# Patient Record
Sex: MALE | Employment: FULL TIME | URBAN - METROPOLITAN AREA
[De-identification: names, ages, dates, MRNs, and addresses within clinical notes are randomized per-mention and may not be internally consistent; named-entity substitution may affect disease eponyms.]

---

## 2017-08-16 ENCOUNTER — TRANSFERRED RECORDS (OUTPATIENT)
Dept: HEALTH INFORMATION MANAGEMENT | Facility: CLINIC | Age: 37
End: 2017-08-16

## 2017-08-16 ENCOUNTER — MEDICAL CORRESPONDENCE (OUTPATIENT)
Dept: HEALTH INFORMATION MANAGEMENT | Facility: CLINIC | Age: 37
End: 2017-08-16

## 2017-08-21 ENCOUNTER — TRANSFERRED RECORDS (OUTPATIENT)
Dept: HEALTH INFORMATION MANAGEMENT | Facility: CLINIC | Age: 37
End: 2017-08-21

## 2017-09-29 ENCOUNTER — PRE VISIT (OUTPATIENT)
Dept: OTOLARYNGOLOGY | Facility: CLINIC | Age: 37
End: 2017-09-29

## 2017-09-29 NOTE — TELEPHONE ENCOUNTER
Records received from [facility].  Office notes:  DOS 8/16/17 with Dr. Sreedhar Guerra  Radiology reports:   DOS 8/21/17 CT Temporal Bones ([facility]) - img in Pacs

## 2017-09-29 NOTE — TELEPHONE ENCOUNTER
1.  Date/reason for appt: 10/10/17 at 10:30 AM - Bilat mixed hearing loss    2.  Referring provider: [facility] Guerra    3.  Call to patient (Yes / No - short description): No    4.  Previous care at:   [facility]   [facility] (CT scan)

## 2017-10-10 DIAGNOSIS — H90.6 MIXED HEARING LOSS, BILATERAL: Primary | ICD-10-CM

## 2017-11-10 DIAGNOSIS — H90.6 MIXED HEARING LOSS, BILATERAL: Primary | ICD-10-CM

## 2017-11-14 ENCOUNTER — OFFICE VISIT (OUTPATIENT)
Dept: OTOLARYNGOLOGY | Facility: CLINIC | Age: 37
End: 2017-11-14

## 2017-11-14 ENCOUNTER — OFFICE VISIT (OUTPATIENT)
Dept: AUDIOLOGY | Facility: CLINIC | Age: 37
End: 2017-11-14

## 2017-11-14 VITALS — HEIGHT: 68 IN | BODY MASS INDEX: 29.4 KG/M2 | WEIGHT: 194 LBS

## 2017-11-14 DIAGNOSIS — H71.92 CHOLESTEATOMA, LEFT: ICD-10-CM

## 2017-11-14 DIAGNOSIS — H90.0 CONDUCTIVE HEARING LOSS, BILATERAL: ICD-10-CM

## 2017-11-14 DIAGNOSIS — H90.A11 CONDUCTIVE HEARING LOSS OF RIGHT EAR WITH RESTRICTED HEARING OF LEFT EAR: ICD-10-CM

## 2017-11-14 DIAGNOSIS — H90.A32 MIXED CONDUCTIVE AND SENSORINEURAL HEARING LOSS OF LEFT EAR WITH RESTRICTED HEARING OF RIGHT EAR: ICD-10-CM

## 2017-11-14 DIAGNOSIS — H61.23 EXCESSIVE CERUMEN IN BOTH EAR CANALS: ICD-10-CM

## 2017-11-14 DIAGNOSIS — H72.92 PERFORATION OF TYMPANIC MEMBRANE, LEFT: Primary | ICD-10-CM

## 2017-11-14 RX ORDER — OFLOXACIN 3 MG/ML
SOLUTION AURICULAR (OTIC)
Qty: 10 ML | Refills: 0 | Status: SHIPPED | OUTPATIENT
Start: 2017-11-14

## 2017-11-14 ASSESSMENT — PAIN SCALES - GENERAL: PAINLEVEL: NO PAIN (0)

## 2017-11-14 NOTE — PROGRESS NOTES
AUDIOLOGY REPORT    SUMMARY: Audiology visit completed. See audiogram for results.      RECOMMENDATIONS: Follow-up with ENT.    Charly Campo.  Licensed Audiologist  [place] #[id number]

## 2017-11-14 NOTE — LETTER
11/14/2017       RE: Xander Peralta  [address]  [place] [address]     Dear Colleague,    Thank you for referring your patient, Xander Peralta, to the [facility] EAR NOSE AND THROAT at [facility]. Please see a copy of my visit note below.    Dear  No primary care provider on file.:    I had the pleasure of meeting Xander Peralta in consultation today at the [facility] at your request.    HISTORY OF PRESENT ILLNESS: Patient is a 37-year-old in today for assessment of both ears. This had a hearing loss worse on the left side for many years. He finally came in for assessment of the hearing and was found to have problems with both ears and potential cholesteatoma. He says he had a lot of drainage in the years past, never really got that treated and it would just come and go. He hasn't had any significant drainage now for several years. Again the main concern has been hearing loss especially on the left side for over 10 years. He has not had any dizziness. No active drainage at this time, there was concern for a right fungal infection when he was seen elsewhere. He has not been using the drops that he was given. He denies any dysphagia, hoarseness, facial paresthesias.    ALLERGIES:  No Known Allergies    HABITS:   Alcohol use Yes    History   Smoking Status     Current Every Day Smoker     Packs/day: 1.00     Years: 10.00     Types: Cigarettes     Start date: 8/7/1996   Smokeless Tobacco     Not on file         PAST MEDICAL HISTORY: Please see today's intake form (for the remainder of the PMH) which I reviewed and signed.  Past Medical History:   Diagnosis Date     Conductive hearing loss little kid     Sensorineural hearing loss little kid       FAMILY HISTORY/SOCIAL HISTORY: No family history on file. Social History     Social History     Marital status:      Spouse name: N/A     Number of children: N/A     Years of education: N/A      Occupational History     Not on file.     Social History Main Topics     Smoking status: Current Every Day Smoker     Packs/day: 1.00     Years: 10.00     Types: Cigarettes     Start date: 8/7/1996     Smokeless tobacco: Not on file     Alcohol use Yes     Drug use: No     Sexual activity: Yes     Partners: Female     Birth control/ protection: Condom     Other Topics Concern     Not on file     Social History Narrative       REVIEW OF SYSTEMS: Please see today's intake form (for the remainder of the ROS) which I have reviewed and signed.    PHYSICIAL EXAMINATION:  Constitutional: The patient was well-groomed and in no acute distress.   Skin: Warm and pink.  Psychiatric: The patient's affect was calm, cooperative, and appropriate.   Respiratory: Breathing comfortably without stridor or exertion of accessory muscles.  Eyes: Pupils were equal and reactive. Extraocular movement intact.   Head: Normocephalic and atraumatic. No lesions or scars.  Ears: Both ears examined with a microscope. Under high-power magnification, it does look like he has significant debris within the right ear canal with possible fungal debris. This was cleaned thoroughly with suction, following cleaning, he has a severe inferior atelectatic TM. The left side was cleaned of significant cerumen. After cleaning, it looks like he has a large posterior TM perforation with attic defect. I can see the head of the stapes, the incus looks eroded. Looks like there is squamous debris within the attic defect.  Nose: Sinuses were nontender. Anterior rhinoscopy revealed midline septum and absence of purulence or polyps.  Oral Cavity: Normal tongue, floor of moth, buccal mucosa, and palate. No lesions or masses on inspection or palpation. No abnormal lymph tissue in the oropharynx.   Neck: The parotid is soft without masses. Supple with normal laryngeal and tracheal landmarks.   Lymphatic: There is no palpable lymphadenopathy or other masses in the neck.    Neurologic: Alert and oriented x 3. Cranial nerves III-XI within normal limits. Voice quality normal.  Cerebellar Function Tests:  Grossly normal    Audiogram:  Audiogram performed shows a mild conductive loss in the right ear and a large maximal conductive loss on the left. Tuning forks confirmed this with negative responses on the left and positive on the right.    IMPRESSION AND PLAN: I discussed the ear findings with him in detail today, concern for possible cholesteatoma and ossicular disruption on the left side causing the significant conductive loss. The external otitis on the right as well and discussed antibiotic ear drops to be used 3 times a day for a week, twice a day for a week, and once a day for a week to the right ear. Discussed the probable need for surgical intervention in the left ear, certainly to help with the hearing, and also remove any cholesteatoma that is found. Risk of surgery discussed as well. I'm going to get him on the eardrops and we will get the CT scan done sent over for review. He is to keep the ear dry until follow-up in 3-4 weeks. Total exam time of over 45 minutes with over half time spent on discussion of findings, treatment options and questions.    Thank you very much for the opportunity to participate in the care of your patient.    Rick L Nissen MD

## 2017-11-14 NOTE — PROGRESS NOTES
Dear  No primary care provider on file.:    I had the pleasure of meeting Xander Peralta in consultation today at the [facility] at your request.    HISTORY OF PRESENT ILLNESS: Patient is a 37-year-old in today for assessment of both ears. This had a hearing loss worse on the left side for many years. He finally came in for assessment of the hearing and was found to have problems with both ears and potential cholesteatoma. He says he had a lot of drainage in the years past, never really got that treated and it would just come and go. He hasn't had any significant drainage now for several years. Again the main concern has been hearing loss especially on the left side for over 10 years. He has not had any dizziness. No active drainage at this time, there was concern for a right fungal infection when he was seen elsewhere. He has not been using the drops that he was given. He denies any dysphagia, hoarseness, facial paresthesias.    ALLERGIES:  No Known Allergies    HABITS:   Alcohol use Yes    History   Smoking Status     Current Every Day Smoker     Packs/day: 1.00     Years: 10.00     Types: Cigarettes     Start date: 8/7/1996   Smokeless Tobacco     Not on file         PAST MEDICAL HISTORY: Please see today's intake form (for the remainder of the PMH) which I reviewed and signed.  Past Medical History:   Diagnosis Date     Conductive hearing loss little kid     Sensorineural hearing loss little kid       FAMILY HISTORY/SOCIAL HISTORY: No family history on file. Social History     Social History     Marital status:      Spouse name: N/A     Number of children: N/A     Years of education: N/A     Occupational History     Not on file.     Social History Main Topics     Smoking status: Current Every Day Smoker     Packs/day: 1.00     Years: 10.00     Types: Cigarettes     Start date: 8/7/1996     Smokeless tobacco: Not on file     Alcohol use Yes     Drug use: No      Sexual activity: Yes     Partners: Female     Birth control/ protection: Condom     Other Topics Concern     Not on file     Social History Narrative       REVIEW OF SYSTEMS: Please see today's intake form (for the remainder of the ROS) which I have reviewed and signed.    PHYSICIAL EXAMINATION:  Constitutional: The patient was well-groomed and in no acute distress.   Skin: Warm and pink.  Psychiatric: The patient's affect was calm, cooperative, and appropriate.   Respiratory: Breathing comfortably without stridor or exertion of accessory muscles.  Eyes: Pupils were equal and reactive. Extraocular movement intact.   Head: Normocephalic and atraumatic. No lesions or scars.  Ears: Both ears examined with a microscope. Under high-power magnification, it does look like he has significant debris within the right ear canal with possible fungal debris. This was cleaned thoroughly with suction, following cleaning, he has a severe inferior atelectatic TM. The left side was cleaned of significant cerumen. After cleaning, it looks like he has a large posterior TM perforation with attic defect. I can see the head of the stapes, the incus looks eroded. Looks like there is squamous debris within the attic defect.  Nose: Sinuses were nontender. Anterior rhinoscopy revealed midline septum and absence of purulence or polyps.  Oral Cavity: Normal tongue, floor of moth, buccal mucosa, and palate. No lesions or masses on inspection or palpation. No abnormal lymph tissue in the oropharynx.   Neck: The parotid is soft without masses. Supple with normal laryngeal and tracheal landmarks.   Lymphatic: There is no palpable lymphadenopathy or other masses in the neck.   Neurologic: Alert and oriented x 3. Cranial nerves III-XI within normal limits. Voice quality normal.  Cerebellar Function Tests:  Grossly normal    Audiogram:  Audiogram performed shows a mild conductive loss in the right ear and a large maximal conductive loss on the left.  Tuning forks confirmed this with negative responses on the left and positive on the right.    IMPRESSION AND PLAN: I discussed the ear findings with him in detail today, concern for possible cholesteatoma and ossicular disruption on the left side causing the significant conductive loss. The external otitis on the right as well and discussed antibiotic ear drops to be used 3 times a day for a week, twice a day for a week, and once a day for a week to the right ear. Discussed the probable need for surgical intervention in the left ear, certainly to help with the hearing, and also remove any cholesteatoma that is found. Risk of surgery discussed as well. I'm going to get him on the eardrops and we will get the CT scan done sent over for review. He is to keep the ear dry until follow-up in 3-4 weeks. Total exam time of over 45 minutes with over half time spent on discussion of findings, treatment options and questions.    Thank you very much for the opportunity to participate in the care of your patient.    Rick L Nissen MD

## 2017-11-14 NOTE — MR AVS SNAPSHOT
After Visit Summary   2017    Xander Peralta    MRN: [id number]           Patient Information     Date Of Birth          1980        Visit Information        Provider Department      2017 7:30 AM Bella Barahona AuD [facility] Audiology        Today's Diagnoses     Conductive hearing loss of right ear with restricted hearing of left ear        Mixed conductive and sensorineural hearing loss of left ear with restricted hearing of right ear           Follow-ups after your visit        Your next 10 appointments already scheduled     2017  8:30 AM CST   (Arrive by 8:15 AM)   NEW NEUROTOLOGY VISIT with Rick L Nissen, MD [facility] Ear Nose and Throat ([facility] [[email or web address] and Surgery [place])    [address] [place] [id number] [phone]              Who to contact     Please call your clinic at [phone] to:    Ask questions about your health    Make or cancel appointments    Discuss your medicines    Learn about your test results    Speak to your doctor   If you have compliments or concerns about an experience at your clinic, or if you wish to file a complaint, please contact [facility] Patient Relations at [phone] or email us at [email or web address]         Additional Information About Your Visit        MyChart Information     [employer] is an electronic gateway that provides easy, online access to your medical records. With "[employer] you can request a clinic appointment, read your test results, renew a prescription or communicate with your care team.     To sign up for [employer] visit the website at [email or web address]   You will be asked to enter the access code listed below, as well as some personal information. Please follow the directions to create your username and password.     Your access code is: [email or web address]  Expires: 2017  5:30 AM     Your access code will  in 90 days. If you need  help or a new code, please contact your [facility] or call [phone] for assistance.        Care EveryWhere ID     This is your Care EveryWhere ID. This could be used by other organizations to access your [facility] medical records  [id number]         Blood Pressure from Last 3 Encounters:   05/02/06 110/70   02/27/06 112/76    Weight from Last 3 Encounters:   05/02/06 72.8 kg (160 lb 8 oz)   02/27/06 70.3 kg (155 lb)              We Performed the Following     AUDIOGRAM/TYMPANOGRAM - INTERFACE     [facility] Audiometry Thrshld Eval & Speech Recog (40022)     Tympanometry (impedance - testing) (15951)        Primary Care Provider    None Specified       No primary provider on file.        Equal Access to Services     BRANDAN COLEMAN : Katelyn wilkinsono Sojohnson, waaxda luqadaha, qaybta kaalmada adeegyada, gilbert hurt . So [facility] [phone].    ATENCIÓN: Si habla español, tiene a norman disposición servicios gratuitos de asistencia lingüística. Llame al [phone].    We comply with applicable federal civil rights laws and [place] laws. We do not discriminate on the basis of race, color, national origin, age, disability, sex, sexual orientation, or gender identity.            Thank you!     Thank you for choosing [facility] AUDIOLOGY  for your care. Our goal is always to provide you with excellent care. Hearing back from our patients is one way we can continue to improve our services. Please take a few minutes to complete the written survey that you may receive in the mail after your visit with us. Thank you!             Your Updated Medication List - Protect others around you: Learn how to safely use, store and throw away your medicines at www.disposemymeds.org.      Notice  As of 11/14/2017  8:16 AM    You have not been prescribed any medications.

## 2017-11-14 NOTE — MR AVS SNAPSHOT
After Visit Summary   2017    Xander Peralta    MRN: [id number]           Patient Information     Date Of Birth          1980        Visit Information        Provider Department      2017 8:30 AM Nissen, Rick L[facility]        Today's Diagnoses     Cholesteatoma, right    -  1       Follow-ups after your visit        Your next 10 appointments already scheduled     2018  8:40 AM CST   (Arrive by 8:25 AM)   NEW NEUROTOLOGY VISIT with MD JOSEFA Hernandez [facility]ose [facility] ([facility] [place])    [address] [place] [id number] [phone]              Who to contact     Please call your clinic at [phone] to:    Ask questions about your health    Make or cancel appointments    Discuss your medicines    Learn about your test results    Speak to your doctor   If you have compliments or concerns about an experience at your clinic, or if you wish to file a complaint, please contact [facility] Patient Relations at [phone] or email us at [email or web address]         Additional Information About Your Visit        MyChart Information     [employer] is an electronic gateway that provides easy, online access to your medical records. With [employer], you can request a clinic appointment, read your test results, renew a prescription or communicate with your care team.     To sign up for [employer] visit the website at [email or web address]   You will be asked to enter the access code listed below, as well as some personal information. Please follow the directions to create your username and password.     Your access code is: [email or web address]  Expires: 2017  5:30 AM     Your access code will  in 90 days. If you need help or a new code, please contact your [facility] or call [phone] for assistance.        Care  "EveryWhere ID     This is your Care EveryWhere ID. This could be used by other organizations to access your [facility] medical records  [id number]        Your Vitals Were     Height BMI (Body Mass Index)                1.72 m (5' 7.72\") 29.74 kg/m2           Blood Pressure from Last 3 Encounters:   05/02/06 110/70   02/27/06 112/76    Weight from Last 3 Encounters:   11/14/17 88 kg (194 lb)   05/02/06 72.8 kg (160 lb 8 oz)   02/27/06 70.3 kg (155 lb)              Today, you had the following     No orders found for display         Today's Medication Changes          These changes are accurate as of: 11/14/17  9:05 AM.  If you have any questions, ask your nurse or doctor.               Start taking these medicines.        Dose/Directions    ofloxacin 0.3 % otic solution   Commonly known as:  FLOXIN   Used for:  Cholesteatoma, right   Started by:  Nissen, Rick L, MD        Instill 3gtts into right ear TID x 7 days, then BID x 7 days, then DAILY x 7 days   Quantity:  10 mL   Refills:  0            Where to get your medicines      These medications were sent to [facility] [address] [place] [address] AT [address] [address]     Phone:  [phone]     ofloxacin 0.3 % otic solution                Primary Care Provider    None Specified       No primary provider on file.        Equal Access to Services     MALI COLEMAN AH: Hadii valeria wilkinsono Soeliasali, waaxda luqadaha, qaybta kaalmada adeegyada, gilbert fuentes. So [facility] [phone].    ATENCIÓN: Si habla español, tiene a norman disposición servicios gratuitos de asistencia lingüística. Llame al [phone].    We comply with applicable federal civil rights laws and [place] laws. We do not discriminate on the basis of race, color, national origin, age, disability, sex, sexual orientation, or gender identity.            Thank you!     Thank you for choosing [facility] EAR NOSE AND " THROAT  for your care. Our goal is always to provide you with excellent care. Hearing back from our patients is one way we can continue to improve our services. Please take a few minutes to complete the written survey that you may receive in the mail after your visit with us. Thank you!             Your Updated Medication List - Protect others around you: Learn how to safely use, store and throw away your medicines at www.disposemymeds.org.          This list is accurate as of: 11/14/17  9:05 AM.  Always use your most recent med list.                   Brand Name Dispense Instructions for use Diagnosis    ofloxacin 0.3 % otic solution    FLOXIN    10 mL    Instill 3gtts into right ear TID x 7 days, then BID x 7 days, then DAILY x 7 days    Cholesteatoma, right

## 2017-12-20 NOTE — TELEPHONE ENCOUNTER
APPT INFO    Date /Time: 1/5/18 at 8:40AM   Reason for Appt: Surgical Consult -Cholesteatoma   Ref Provider/Clinic: Rick Nissen   Are there internal records? Yes/No?  IF YES, list clinic [facility]   Are there outside records? Yes/No? [facility] (See pre-visit for Nissen for details)   Patient Contact (Y/N) & Call Details: No, referred   Action:

## 2018-01-05 ENCOUNTER — HOSPITAL ENCOUNTER (OUTPATIENT)
Facility: CLINIC | Age: 38
End: 2018-01-05
Attending: OTOLARYNGOLOGY | Admitting: OTOLARYNGOLOGY
Payer: COMMERCIAL

## 2018-01-05 ENCOUNTER — OFFICE VISIT (OUTPATIENT)
Dept: OTOLARYNGOLOGY | Facility: CLINIC | Age: 38
End: 2018-01-05
Payer: COMMERCIAL

## 2018-01-05 ENCOUNTER — PRE VISIT (OUTPATIENT)
Dept: OTOLARYNGOLOGY | Facility: CLINIC | Age: 38
End: 2018-01-05

## 2018-01-05 VITALS — BODY MASS INDEX: 30.01 KG/M2 | HEIGHT: 68 IN | WEIGHT: 198 LBS

## 2018-01-05 DIAGNOSIS — H71.93 CHOLESTEATOMA, BILATERAL: Primary | ICD-10-CM

## 2018-01-05 PROBLEM — M54.9 BACK PAIN WITHOUT RADIATION: Status: ACTIVE | Noted: 2017-08-10

## 2018-01-05 PROBLEM — F11.91 HISTORY OF NARCOTIC USE: Status: ACTIVE | Noted: 2017-08-10

## 2018-01-05 PROBLEM — F98.8 ADD (ATTENTION DEFICIT DISORDER): Status: ACTIVE | Noted: 2017-03-26

## 2018-01-05 PROBLEM — Z72.0 TOBACCO USE: Status: ACTIVE | Noted: 2017-03-24

## 2018-01-05 PROBLEM — E78.5 HYPERLIPIDEMIA: Status: ACTIVE | Noted: 2017-03-26

## 2018-01-05 RX ORDER — CIPROFLOXACIN AND DEXAMETHASONE 3; 1 MG/ML; MG/ML
SUSPENSION/ DROPS AURICULAR (OTIC)
Qty: 7.5 ML | Refills: 0 | Status: SHIPPED | OUTPATIENT
Start: 2018-01-05 | End: 2018-01-15

## 2018-01-05 ASSESSMENT — PAIN SCALES - GENERAL: PAINLEVEL: NO PAIN (0)

## 2018-01-05 NOTE — MR AVS SNAPSHOT
After Visit Summary   1/5/2018    Xander Peralta    MRN: [id number]           Patient Information     Date Of Birth          1980        Visit Information        Provider Department      1/5/2018 8:40 AM Marley Terrazas MD [facility]        Today's Diagnoses     Cholesteatoma, bilateral    -  1      Care Instructions    1. Please follow-up in clinic on 1/16 at 2:45pm. We will help you arrange a Pre-op appointment for the same day.   2. Please call the ENT clinic with any questions,concerns, new or worsening symptoms.    -Clinic number is [phone]   - [facility] direct line (Dr. Terrazas' nurse) [phone]    3. Please  your ciprodex drops at the pharmacy. You will need to place 4-5 drops into your right ear for 2 weeks.           Follow-ups after your visit        Your next 10 appointments already scheduled     Jan 16, 2018  2:45 PM CST   (Arrive by 2:30 PM)   RETURN CRANIOFACIAL SKULL BASE with Marley Terrazas MD   [facility] ([facility] [place])    [address] [place] [id number] [phone]              Who to contact     Please call your clinic at [phone] to:    Ask questions about your health    Make or cancel appointments    Discuss your medicines    Learn about your test results    Speak to your doctor   If you have compliments or concerns about an experience at your clinic, or if you wish to file a complaint, please contact [facility] Patient Relations at [phone] or email us at [email or web address]         Additional Information About Your Visit        IPS Gr[employer]phart Information     Kl[employer] is an electronic gateway that provides easy, online access to your medical records. With [employer], you can request a clinic appointment, read your test results, renew a prescription or communicate with your care team.     To sign up for [employer] visit the  "website at [email or web address]   You will be asked to enter the access code listed below, as well as some personal information. Please follow the directions to create your username and password.     Your access code is: [email or web address]  Expires: 2018 10:11 AM     Your access code will  in 90 days. If you need help or a new code, please contact your [facility] or call [phone] for assistance.        Care EveryWhere ID     This is your Care EveryWhere ID. This could be used by other organizations to access your [place] medical records  [id number]        Your Vitals Were     Height BMI (Body Mass Index)                1.72 m (5' 7.72\") 30.36 kg/m2           Blood Pressure from Last 3 Encounters:   06 110/70   06 112/76    Weight from Last 3 Encounters:   18 89.8 kg (198 lb)   17 88 kg (194 lb)   06 72.8 kg (160 lb 8 oz)              We Performed the Following     Minnie-Operative Worksheet (Neurotology)          Today's Medication Changes          These changes are accurate as of: 18 10:11 AM.  If you have any questions, ask your nurse or doctor.               Start taking these medicines.        Dose/Directions    ciprofloxacin-dexamethasone otic suspension   Commonly known as:  CIPRODEX   Used for:  Cholesteatoma, bilateral   Started by:  Marley Terrazas MD        Instill 5 drops into the right ear twice daily for 14 days   Quantity:  7.5 mL   Refills:  0            Where to get your medicines      These medications were sent to [facility] [id number] [address]  [address] [place] [address] [facility] [address]     Phone:  [phone]     ciprofloxacin-dexamethasone otic suspension                Primary Care Provider    None Specified       No primary provider on file.        Equal Access to Services     BRANDAN COLEMAN AH: harley Jama, judah " gilbert pradojen hurt ah. [facility] [phone].    ATENCIÓN: Si bartolo nguyen, tiene a norman disposición servicios gratuitos de asistencia lingüística. Isabel al [phone].    We comply with applicable federal civil rights laws and [place] laws. We do not discriminate on the basis of race, color, national origin, age, disability, sex, sexual orientation, or gender identity.            Thank you!     Thank you for choosing [facility] E[facility] TH[facility]  for your care. Our goal is always to provide you with excellent care. Hearing back from our patients is one way we can continue to improve our services. Please take a few minutes to complete the written survey that you may receive in the mail after your visit with us. Thank you!             Your Updated Medication List - Protect others around you: Learn how to safely use, store and throw away your medicines at www.disposemymeds.org.          This list is accurate as of: 1/5/18 10:11 AM.  Always use your most recent med list.                   Brand Name Dispense Instructions for use Diagnosis    ciprofloxacin-dexamethasone otic suspension    CIPRODEX    7.5 mL    Instill 5 drops into the right ear twice daily for 14 days    Cholesteatoma, bilateral       ofloxacin 0.3 % otic solution    FLOXIN    10 mL    Instill 3gtts into right ear TID x 7 days, then BID x 7 days, then DAILY x 7 days        varenicline 0.5 MG X 11 & 1 MG X 42 tablet    CHANTIX EFREN     Take one 0.5mg tab daily for 3 days, then one 0.5mg tab twice daily for 4 days, then one 1mg tab twice daily.

## 2018-01-05 NOTE — PATIENT INSTRUCTIONS
1. Please follow-up in clinic on 1/16 at 2:45pm. We will help you arrange a Pre-op appointment for the same day.   2. Please call the ENT clinic with any questions,concerns, new or worsening symptoms.    -Clinic number is [phone]   - [facility] direct line (Dr. Terrazas' nurse) [phone]    3. Please  your ciprodex drops at the pharmacy. You will need to place 4-5 drops into your right ear for 2 weeks.      4. We will plan for surgery on 2/14/18 at 7:30am. You will need to arrange at the hospital at 5:30am. Please review the information given to you today in clinic. We will discuss when you return on 1/16

## 2018-01-05 NOTE — LETTER
1/5/2018       RE: Xander Peralta  [address] [address]     Dear Colleague,    Thank you for referring your patient, Xander Peralta, to the [facility] at [facility]. Please see a copy of my visit note below.    ENT Clinic Note  1/5/2018    Mr. Xander Delgadillo is a 37yoM with no significant past medical history who presents to our clinic today for assessment and evaluation of a potential cholesteatoma. The patient states that he's had poor hearing for most of his life. He has been seen by an ENT multiple times over the past several years. He was noted to have squamous debris in his left EAC, with concern for cholesteatoma.     A CT was obtained, which was reviewed today, which showed evidence of a cholesteatoma in the right middle ear with significant scutal defects bilaterally. He denies any significant otorrhea, vertigo, nausea or vomiting. He denies any recent changes in hearing. Denies auricular trauma.    PMHx:  - HLD  PSHx:  - b/l PE tubes, appendectomy, tonsillectomy   Meds:  - Lipitor   Allergies:  - NKDA  Tobacco:  - 1ppd [id number], recently quit   EtOH:  - socially   ROS:  - A complete ROS is negative, otherwise stated in the HPI    PHYSICAL EXAM  Gen: awake, alert; in no distress  HEENT: normocephalic, atraumatic; face is symmetric HB1; bilateral auricular exam is grossly normal; both tympanic membranes were visualized under the operative microscope, the left TM was visible and retracted down onto the stapes, the right TM had a retraction pocket with apparent squamous debris present in the pars flaccida; buccal mucosa was moist and intact; neck is soft, nontender to palpation  Resp: non labored breathing on room air    Audiogram:  Right normal to moderate conductive hearing loss, left moderate to profound mixed hearing loss.       PLAN:  Mr. Xander Delgadillo is a 37yoM with PMHx of HLD who has an apparent right sided  cholesteatoma, evident on physical exam and CT images. He also has significant retraction of the left TM with apparent erosion of the ossicles. He will need operative management for both of his ears. At this time, he does have a lot of debris in the right EAC and on the TM. His right ear is his better hearing ear, thus he is reluctant to move directly so surgery. We will start him on floxin drops and see him back in clinic in 2-3 weeks. At that time, we will discuss the his surgical options. He will require surgery on both of his ears. We will also get an over read of the OSH CT images to evaluate for a possible enlarged stapedial artery.     Fanny Ruiz MD  Otolaryngology Resident     I, Marley Terrazas, saw this patient with the resident/fellow and agree with the resident s findings and plan of care as documented in the resident s/fellow s note. I was present for the entire procedure.      Again, thank you for allowing me to participate in the care of your patient.      Sincerely,    Marley Terrazas MD

## 2018-01-05 NOTE — PROGRESS NOTES
ENT Clinic Note  1/5/2018    Mr. Xander Delgadillo is a 37yoM with no significant past medical history who presents to our clinic today for assessment and evaluation of a potential cholesteatoma. The patient states that he's had poor hearing for most of his life. He has been seen by an ENT multiple times over the past several years. He was noted to have squamous debris in his left EAC, with concern for cholesteatoma.     A CT was obtained, which was reviewed today, which showed evidence of a cholesteatoma in the right middle ear with significant scutal defects bilaterally. He denies any significant otorrhea, vertigo, nausea or vomiting. He denies any recent changes in hearing. Denies auricular trauma.    PMHx:  - HLD  PSHx:  - b/l PE tubes, appendectomy, tonsillectomy   Meds:  - Lipitor   Allergies:  - NKDA  Tobacco:  - 1ppd [id number], recently quit   EtOH:  - socially   ROS:  - A complete ROS is negative, otherwise stated in the HPI    PHYSICAL EXAM  Gen: awake, alert; in no distress  HEENT: normocephalic, atraumatic; face is symmetric HB1; bilateral auricular exam is grossly normal; both tympanic membranes were visualized under the operative microscope, the left TM was visible and retracted down onto the stapes, the right TM had a retraction pocket with apparent squamous debris present in the pars flaccida; buccal mucosa was moist and intact; neck is soft, nontender to palpation  Resp: non labored breathing on room air    Audiogram:  Right normal to moderate conductive hearing loss, left moderate to profound mixed hearing loss.       PLAN:  Mr. Xander Delgadillo is a 37yoM with PMHx of HLD who has an apparent right sided cholesteatoma, evident on physical exam and CT images. He also has significant retraction of the left TM with apparent erosion of the ossicles. He will need operative management for both of his ears. At this time, he does have a lot of debris in the right EAC and on the TM. His right ear is his better  hearing ear, thus he is reluctant to move directly so surgery. We will start him on floxin drops and see him back in clinic in 2-3 weeks. At that time, we will discuss the his surgical options. He will require surgery on both of his ears. We will also get an over read of the OSH CT images to evaluate for a possible enlarged stapedial artery.     Fanny Ruiz MD  Otolaryngology Resident     I, Marley Terrazas, saw this patient with the resident/fellow and agree with the resident s findings and plan of care as documented in the resident s/fellow s note. I was present for the entire procedure.

## 2018-01-05 NOTE — NURSING NOTE
Chief Complaint   Patient presents with     Consult     surgical consult - cholesteatoma     Rula Reid Medical Assistant

## 2018-01-09 ENCOUNTER — HOSPITAL ENCOUNTER (INPATIENT)
Dept: GENERAL RADIOLOGY | Facility: CLINIC | Age: 38
End: 2018-01-09
Attending: OTOLARYNGOLOGY

## 2018-01-16 ENCOUNTER — OFFICE VISIT (OUTPATIENT)
Dept: OTOLARYNGOLOGY | Facility: CLINIC | Age: 38
End: 2018-01-16
Payer: COMMERCIAL

## 2018-01-16 VITALS — HEIGHT: 67 IN | BODY MASS INDEX: 31.71 KG/M2 | WEIGHT: 202 LBS

## 2018-01-16 DIAGNOSIS — H71.92 CHOLESTEATOMA, LEFT: Primary | ICD-10-CM

## 2018-01-16 ASSESSMENT — PAIN SCALES - GENERAL: PAINLEVEL: NO PAIN (0)

## 2018-01-16 NOTE — NURSING NOTE
Relevant Diagnosis: Left cholesteatoma  Teaching Topic: surgery: left canal wall down, tymp/mast  Person(s) involved in teaching:  Patient     Teaching Concerns Addressed:  Pre op teaching included the need for an H&P, NPO status pre op, hospital routines, expected recovery, activity  restrictions, antimicrobial scrub, s/s of infection, pain control methods and the importance of follow up appointments.  The patient voiced an understanding of all instructions and will call with questions.     Motivation Level:  Asks Questions:   Yes  Eager to Learn:   Very reticent-has not accepted that he needs to do something  Cooperative:   Yes  Receptive (willing/able to accept information):   Yes-not really engaged, but not rude or angry     Patient  demonstrates understanding of the following:  Reason for the appointment, diagnosis and treatment plan:   Not sure he totally understands the severety of the diagnosis  Knowledge of proper use of medications and conditions for which they are ordered (with special attention to potential side effects or drug interactions):   Yes  Which situations necessitate calling provider and whom to contact:   Yes        Proper use and care of  (medical equip, care aids, etc.):   NA  Nutritional needs and diet plan:   Yes  Pain management techniques:   Yes  Patient instructed on hand hygiene:  Yes  How and/when to access community resources:   NA     Infection Prevention:  Patient   demonstrates understanding of the following:  Surgical procedure site care taught   Signs and symptoms of infection taught Yes  Wound care taught Yes     Instructional Materials Used/Given: Pre op booklet.    Patient did not ask questions about post surgical care, did ask about time off work and what his ear would look like. Did review post op care.

## 2018-01-16 NOTE — LETTER
1/16/2018       RE: Xander Peralta  [address] [address]     Dear Colleague,    Thank you for referring your patient, Xander Peralta, to the [facility] E[facility] NO[facility] TH[facility]AT at [facility]. Please see a copy of my visit note below.    ENT Clinic Note  1/16/2018    HPI: Mr. Peralta returns to clinic today for a follow up ear exam, CT scan review, and discussion for potential surgery. He was last seen in clinic on 1/5/18 for cholesteatoma consultation. On the initial visit, he was found the have significant squamous debris with associated TM retraction of the right ear, and myringostapedopexy of the left TM. He was started on floxin drops to clear the debris, and an over read of his previous CT was requested. He was been using the floxin drops as instructed, but only in the right ear, and denies any changes in his symptoms/hearing.    Patient Supplied Answers to Review of Systems  [facility] ROS 1/16/2018   Ears, Nose, Throat Ringing/noise in ears   Cardiopulmonary -   Gastrointestinal/Genitourinary -   The remainder of the 10 point review of systems was negative.        PHYSICAL EXAM  Gen: awake, alert; in no distress  HEENT: face is symmetric, HB1;   The ears were examined underneath the microscope.    The left tympanic membrane is retracted with myringostapedopexy with significant retraction of the drum inferiorly; a large scutal defect is present with apparent cholesteatoma in the attic; the right TM has significant retraction of the pars flaccida without debris present    Resp: non labored breathing no room air    Imaging:  CT Temporal Bone (8/21/17)  1. Right pars flaccida cholesteatoma with associated erosion of the bony scutum and malleus/incus.  2. Presumed sequelae of prior left cholesteatoma with thickened/perforated left tympanic membrane and chronic erosive changes of the malleus/incus.  3. Bilateral chronic mastoiditis.  4. Asymmetric prominence  of the left subarcuate canal. This is considered a normal variant, though most often seen in children.    ASSESSMENT/PLAN: Mr. Peralta has bilateral cholesteatomas present. He has more extensive disease in the left ear, compared to the right. His right ear is his better hearing ear.  Both ears are requiring surgery, but the patient would like to proceed with left ear first.    Our recommendation is for a left canal wall down tympanomastoidectomy.  We reviewed the steps of the procedure including the incision, the need for a meatal plasty, and the common and serious risks attendant with ear surgery including complete sensorineural hearing loss, additional conductive loss, dizziness, tinnitus, facial nerve paralysis, taste change, CSF leak, meningitis, recurrence of cholesteatoma, and need for additional procedures.  He is very concerned about the opening in the ear being larger and there are best to explain the reasoning for this, and how these ears do require lifelong mastoid bowl cleanings.  We contrasted this to the risk of untreated cholesteatoma with loss of inner ear hearing and balance function as well as potential brain abscess facial paralysis and other untoward complications.     While we do have time reserved for him for a Left canal wall down tympanomastoidectomy, it is our impression that the patient is uncertain if he is going to proceed with surgery as recommended.  He does understand the risks of not treating the cholesteatoma, as he was able to participate in the discussion and indicated that he understands them.  He is going to let us know if he changes his decisions. He should continue to floxin drops in the left ear if he is planning to proceed with surgery within the next month as planned.      Fanny Ruiz MD  Otolaryngology Resident     I, Marley Terrazas, saw this patient with the resident/fellow and agree with the resident s findings and plan of care as documented in the  resident s/fellow s note. I was present for the entire procedure.       Again, thank you for allowing me to participate in the care of your patient.      Sincerely,    Marley Terrazas MD

## 2018-01-16 NOTE — NURSING NOTE
"Chief Complaint   Patient presents with     RECHECK     follow up   Height 1.71 m (5' 7.32\"), weight 91.6 kg (202 lb).      Luis Arceo LPN    "

## 2018-01-16 NOTE — PROGRESS NOTES
ENT Clinic Note  1/16/2018    HPI: Mr. Peralta returns to clinic today for a follow up ear exam, CT scan review, and discussion for potential surgery. He was last seen in clinic on 1/5/18 for cholesteatoma consultation. On the initial visit, he was found the have significant squamous debris with associated TM retraction of the right ear, and myringostapedopexy of the left TM. He was started on floxin drops to clear the debris, and an over read of his previous CT was requested. He was been using the floxin drops as instructed, but only in the right ear, and denies any changes in his symptoms/hearing.    Patient Supplied Answers to Review of Systems  [facility] ROS 1/16/2018   Ears, Nose, Throat Ringing/noise in ears   Cardiopulmonary -   Gastrointestinal/Genitourinary -   The remainder of the 10 point review of systems was negative.        PHYSICAL EXAM  Gen: awake, alert; in no distress  HEENT: face is symmetric, HB1;   The ears were examined underneath the microscope.    The left tympanic membrane is retracted with myringostapedopexy with significant retraction of the drum inferiorly; a large scutal defect is present with apparent cholesteatoma in the attic; the right TM has significant retraction of the pars flaccida without debris present    Resp: non labored breathing no room air    Imaging:  CT Temporal Bone (8/21/17)  1. Right pars flaccida cholesteatoma with associated erosion of the bony scutum and malleus/incus.  2. Presumed sequelae of prior left cholesteatoma with thickened/perforated left tympanic membrane and chronic erosive changes of the malleus/incus.  3. Bilateral chronic mastoiditis.  4. Asymmetric prominence of the left subarcuate canal. This is considered a normal variant, though most often seen in children.    ASSESSMENT/PLAN: Mr. Peralta has bilateral cholesteatomas present. He has more extensive disease in the left ear, compared to the right. His right ear is his better hearing ear.  Both ears are  requiring surgery, but the patient would like to proceed with left ear first.    Our recommendation is for a left canal wall down tympanomastoidectomy.  We reviewed the steps of the procedure including the incision, the need for a meatal plasty, and the common and serious risks attendant with ear surgery including complete sensorineural hearing loss, additional conductive loss, dizziness, tinnitus, facial nerve paralysis, taste change, CSF leak, meningitis, recurrence of cholesteatoma, and need for additional procedures.  He is very concerned about the opening in the ear being larger and there are best to explain the reasoning for this, and how these ears do require lifelong mastoid bowl cleanings.  We contrasted this to the risk of untreated cholesteatoma with loss of inner ear hearing and balance function as well as potential brain abscess facial paralysis and other untoward complications.     While we do have time reserved for him for a Left canal wall down tympanomastoidectomy, it is our impression that the patient is uncertain if he is going to proceed with surgery as recommended.  He does understand the risks of not treating the cholesteatoma, as he was able to participate in the discussion and indicated that he understands them.  He is going to let us know if he changes his decisions. He should continue to floxin drops in the left ear if he is planning to proceed with surgery within the next month as planned.      Fanny Ruiz MD  Otolaryngology Resident     I, Marley Terrazas, saw this patient with the resident/fellow and agree with the resident s findings and plan of care as documented in the resident s/fellow s note. I was present for the entire procedure.

## 2018-01-16 NOTE — MR AVS SNAPSHOT
After Visit Summary   2018    Xander Peralta    MRN: [id number]           Patient Information     Date Of Birth          1980        Visit Information        Provider Department      2018 2:45 PM Marley Terrazas MD [facility]        Care Instructions    Plan of care:  Please contact Lisa to schedule surgery  Clinic contact information:  1. To schedule an appointment call [phone], option 1  2. To talk to the Triage RN call [phone], option 3  3. If you need to speak to Maria Fernanda or get a message to your doctor on a Friday, call the triage RN  4. Maria FernandaRN: [phone]  5. Surgery scheduling:      Lisa Martino: [phone]      Belinda Fna: [phone]  6. Fax: [phone]  7. [phone]      Pre-op reminders:  1. Complete pre-op H+P within 30 days of surgery (recommend pre-op appointment 2 weeks prior to surgery date).   2.  needed on day of surgery.   3.Discuss all medications, including blood-thinning medications with PCP at pre-op appointment (Coumadin, Plavix, Aspirin, Ibuprofen/Motrin, Vitamin E and Fish Oil), which may need to be discontinued 7 days prior to surgery date.   4. Nothing to eat or drink after midnight the night before surgery.   5. Take shower or bath the morning of surgery and remove deodorant, cologne, scented lotion, makeup, nail polish and jewelry.             Follow-ups after your visit        Your next 10 appointments already scheduled     2018  8:30 AM CST   (Arrive by 8:15 AM)   PAC EVALUATION with [facility]   [facility] ([facility])    [address] [place] [id number] [phone]            2018  9:30 AM CST   (Arrive by 9:15 AM)   PAC RN ASSESSMENT with Warner Pac Rn   [facility] ([facility])    [address] [place] [id number]    [phone]            2018 10:00 AM CST   (Arrive by 9:45 AM)   PAC Anesthesia Consult with  Pac Anesthesiologist   [facility] ([facility])    [address] [place] [id number] [phone]            2018   Procedure with Marley Terrazas MD   [facility] [place], Same Day Surgery (--)    [address]  [facility] [address] [phone]            Mar 02, 2018  8:20 AM CST   (Arrive by 8:05 AM)   Return Visit with Marley Terrazas MD   [facility] ([facility])    [address] [place] [id number] [phone]              Who to contact     Please call your clinic at [phone] to:    Ask questions about your health    Make or cancel appointments    Discuss your medicines    Learn about your test results    Speak to your doctor   If you have compliments or concerns about an experience at your clinic, or if you wish to file a complaint, please contact [facility] Patient Relations at [phone] or email us at [email or web address]         Additional Information About Your Visit        [employer] Information     Fanear is an electronic gateway that provides easy, online access to your medical records. With Fanear, you can request a clinic appointment, read your test results, renew a prescription or communicate with your care team.     To sign up for Memriset visit the website at [email or web address]   You will be asked to enter the access code listed below, as well as some personal information. Please follow the directions to create your username and password.     Your access code is: [email or web address]  Expires: 2018 10:11 AM     Your access code will  in 90 days. If you need help or a new code, please contact your [facility] or call [phone] for  "assistance.        Care EveryWhere ID     This is your Care EveryWhere ID. This could be used by other organizations to access your [facility] medical records  [id number]        Your Vitals Were     Height BMI (Body Mass Index)                1.71 m (5' 7.32\") 31.33 kg/m2           Blood Pressure from Last 3 Encounters:   05/02/06 110/70   02/27/06 112/76    Weight from Last 3 Encounters:   01/16/18 91.6 kg (202 lb)   01/05/18 89.8 kg (198 lb)   11/14/17 88 kg (194 lb)              Today, you had the following     No orders found for display       Primary Care Provider    None Specified       No primary provider on file.        Equal Access to Services     BRANDAN COLEMAN : Katelyn Hardy, harley gupta, judah ayala, gilbert hurt . [facility] [phone].    ATENCIÓN: Si habla español, tiene a norman disposición servicios gratuitos de asistencia lingüística. Llame al [phone].    We comply with applicable federal civil rights laws and [place] laws. We do not discriminate on the basis of race, color, national origin, age, disability, sex, sexual orientation, or gender identity.            Thank you!     Thank you for choosing [facility]  for your care. Our goal is always to provide you with excellent care. Hearing back from our patients is one way we can continue to improve our services. Please take a few minutes to complete the written survey that you may receive in the mail after your visit with us. Thank you!             Your Updated Medication List - Protect others around you: Learn how to safely use, store and throw away your medicines at www.disposemymeds.org.          This list is accurate as of: 1/16/18  4:31 PM.  Always use your most recent med list.                   Brand Name Dispense Instructions for use Diagnosis    ofloxacin 0.3 % otic solution    FLOXIN    10 mL    Instill 3gtts into right ear TID x 7 days, then BID x 7 days, " then DAILY x 7 days        varenicline 0.5 MG X 11 & 1 MG X 42 tablet    CHANTIX EFREN     Take one 0.5mg tab daily for 3 days, then one 0.5mg tab twice daily for 4 days, then one 1mg tab twice daily.

## 2018-01-16 NOTE — PATIENT INSTRUCTIONS
Plan of care:  Please contact Lisa to schedule surgery  Clinic contact information:  1. To schedule an appointment call [phone], option 1  2. To talk to the Triage RN call [phone], option 3  3. If you need to speak to Maria Fernanda or get a message to your doctor on a Friday, call the triage RN  4. Maria FernandaRN: [phone]  5. Surgery scheduling:      Lisa Martino: [phone]      Belinda Fan: [phone]  6. Fax: [phone]  7. [phone]      Pre-op reminders:  1. Complete pre-op H+P within 30 days of surgery (recommend pre-op appointment 2 weeks prior to surgery date).   2.  needed on day of surgery.   3.Discuss all medications, including blood-thinning medications with PCP at pre-op appointment (Coumadin, Plavix, Aspirin, Ibuprofen/Motrin, Vitamin E and Fish Oil), which may need to be discontinued 7 days prior to surgery date.   4. Nothing to eat or drink after midnight the night before surgery.   5. Take shower or bath the morning of surgery and remove deodorant, cologne, scented lotion, makeup, nail polish and jewelry.

## 2018-01-22 ENCOUNTER — TELEPHONE (OUTPATIENT)
Dept: OTOLARYNGOLOGY | Facility: CLINIC | Age: 38
End: 2018-01-22

## 2018-01-22 NOTE — TELEPHONE ENCOUNTER
1/22/18  Pt returned call about surgery scheduling.  AT this time he does not want to schedule any surgery.  He is going for a 2nd opinion,    ELOY - he was scheduled for PAC H&P / consut 1/19/18 and he no showed for this appt.    Surgery was scheduled for 2/14/18 - this was cancelled.    He will call if he wishes to pursue surgery.    Lisa Martino   [facility]-Op Coordinator  [phone]